# Patient Record
Sex: FEMALE | Race: WHITE | NOT HISPANIC OR LATINO | URBAN - METROPOLITAN AREA
[De-identification: names, ages, dates, MRNs, and addresses within clinical notes are randomized per-mention and may not be internally consistent; named-entity substitution may affect disease eponyms.]

---

## 2021-08-09 ENCOUNTER — OFFICE VISIT (OUTPATIENT)
Dept: URBAN - METROPOLITAN AREA CLINIC 19 | Facility: CLINIC | Age: 66
End: 2021-08-09

## 2021-08-09 PROBLEM — 428283002: Status: ACTIVE | Noted: 2021-08-09

## 2021-08-09 RX ORDER — FLUTICASONE PROPIONATE AND SALMETEROL XINAFOATE 115; 21 UG/1; UG/1
INHALE 2 PUFFS BY INHALATION ROUTE 2 TIMES PER DAY IN THE MORNING AND EVENING AEROSOL, METERED RESPIRATORY (INHALATION) 2
Qty: 1 | Refills: 0 | Status: ACTIVE | COMMUNITY
Start: 1900-01-01 | End: 1900-01-01

## 2021-08-09 RX ORDER — TIOTROPIUM BROMIDE 18 UG/1
CAPSULE ORAL; RESPIRATORY (INHALATION)
Qty: 0 | Refills: 0 | Status: ACTIVE | COMMUNITY
Start: 1900-01-01 | End: 1900-01-01

## 2021-08-09 RX ORDER — ATORVASTATIN CALCIUM 20 MG/1
TABLET, FILM COATED ORAL
Qty: 0 | Refills: 0 | Status: ACTIVE | COMMUNITY
Start: 1900-01-01 | End: 1900-01-01

## 2021-08-09 NOTE — HPI-TODAY'S VISIT:
The patient was referred by Dr. Dorota Dickey for colon cancer screening. A copy of this document is being forwarded to the referring provider.    Ms. Mcconnell is a 65 year-old female presents for a colon cancer screening. Last colonoscopy: 2017 at [place]. She had 7 polyps and was advised to follow up in 3 years. No family history of colon polyps or colon cancer. Patient denies nausea, heartburn, dysphagia, abdominal pain, rectal bleeding, unintentional weight loss, and loss of appetite. She has bowel movement everyday, soft and formed. She denies blood thinner use, pacemaker/defibrillator, diabetes, kidney disease, and home O2. Follows ___ cardiologist. Last seen by Dr. Harper in 2018 for GERD. Had an EGD 5/2018- chronic gastritis.    Has COPD